# Patient Record
Sex: MALE | Race: WHITE
[De-identification: names, ages, dates, MRNs, and addresses within clinical notes are randomized per-mention and may not be internally consistent; named-entity substitution may affect disease eponyms.]

---

## 2017-02-02 ENCOUNTER — HOSPITAL ENCOUNTER (EMERGENCY)
Dept: HOSPITAL 25 - UCCORT | Age: 22
Discharge: HOME | End: 2017-02-02
Payer: COMMERCIAL

## 2017-02-02 VITALS — SYSTOLIC BLOOD PRESSURE: 146 MMHG | DIASTOLIC BLOOD PRESSURE: 85 MMHG

## 2017-02-02 DIAGNOSIS — J11.1: Primary | ICD-10-CM

## 2017-02-02 DIAGNOSIS — Z72.0: ICD-10-CM

## 2017-02-02 PROCEDURE — 99201: CPT

## 2017-02-02 PROCEDURE — G0463 HOSPITAL OUTPT CLINIC VISIT: HCPCS

## 2017-02-02 NOTE — UC
Respiratory Complaint HPI





- HPI Summary


HPI Summary: 





5 days of cough, chills, body aches. Temp up to 101, but lower today, last dose 

of ibuprofen was this morning. 


Decreased appetite, but no vomiting or diarrhea. + headache. 





- History of Current Complaint


Chief Complaint: UCRespiratory


Stated Complaint: COUGH


Time Seen by Provider: 17 18:31


Hx Obtained From: Patient


Onset/Duration: Gradual Onset, Lasting Days - 5


Timing: Constant


Severity Initially: Moderate


Severity Currently: Moderate


Character: Cough: Nonproductive


Aggravating Factors: Exertion


Alleviating Factors: OTC Meds


Associated Signs And Symptoms: Positive: Fever, Chills





- Risk Factors


Pulmonary Embolism Risk Factors: Negative


Cardiac Risk Factors: Negative


Pseudomonas Risk Factors: Negative


Tuberculosis Risk Factors: Negative





- Allergies/Home Medications


Allergies/Adverse Reactions: 


 Allergies











Allergy/AdvReac Type Severity Reaction Status Date / Time


 


No Known Allergies Allergy   Verified 17 17:49











Home Medications: 


 Home Medications





Ascorbic Acid TAB* [Vitamin C  TAB*] 500 mg PO DAILY PRN 17 [History 

Confirmed 17]


Ibuprofen TAB* [Advil TAB*] 400 mg PO Q8H PRN 17 [History Confirmed ]


Phenylephrine-Doxylamine-Dextr [Nyquil Severe Cold/Flu 5-6.25- mg/15Ml] 2 

tab PO QPM PRN 17 [History Confirmed 17]


guaiFENesin ER TAB [Mucinex*] 600 mg PO ONCE PRN 17 [History Confirmed ]











PMH/Surg Hx/FS Hx/Imm Hx


Previously Healthy: Yes





- Surgical History


Surgical History: None





- Family History


Known Family History: Positive: Other - father  CA pancreas, lymphoma age 50





- Social History


Occupation: Student


Lives: Alone - --dorm


Alcohol Use: Weekly


Alcohol Amount: 10


Substance Use Type: None


Smoking Status (MU): Current Some Day Smoker





Review of Systems


Constitutional: Fever, Fatigue


Skin: Negative


Eyes: Negative


ENT: Sore Throat


Respiratory: Cough


Cardiovascular: Negative


Gastrointestinal: Negative


Genitourinary: Negative


Motor: Negative


Neurovascular: Negative


Musculoskeletal: Arthralgia


Neurological: Headache


Psychological: Negative


All Other Systems Reviewed And Are Negative: Yes





Physical Exam


Triage Information Reviewed: Yes


Appearance: Ill-Appearing - looks mildly unwell.


Vital Signs: 


 Initial Vital Signs











Temp  98.3 F   17 17:42


 


Pulse  72   17 17:42


 


Resp  20   17 17:42


 


BP  146/85   17 17:42


 


Pulse Ox  100   17 17:42











Eyes: Positive: Conjunctiva Clear


ENT: Positive: Pharyngeal erythema


Neck exam: Normal


Neck: Positive: Supple, Nontender, No Lymphadenopathy


Respiratory: Positive: Lungs clear, Normal breath sounds


Cardiovascular: Positive: RRR, No Murmur


Abdomen Description: Positive: Nontender, No Organomegaly, Soft


Musculoskeletal Exam: Normal


Neurological Exam: Normal


Neurological: Positive: Alert, Muscle Tone Normal


Psychological Exam: Normal


Skin Exam: Normal





UC Diagnostic Evaluation





- Laboratory


O2 Sat by Pulse Oximetry: 100





Respiratory Course/Dx





- Course


Course Of Treatment: symptomatic treatment of influenza





- Differential Dx/Diagnosis


Differential Diagnosis/HQI/PQRI: Bronchitis, Influenza, Laryngitis, Sinusitis


Provider Diagnoses: influenza





Discharge





- Discharge Plan


Condition: Stable


Disposition: HOME


Patient Education Materials:  Influenza (ED)


Forms:  *School Release


Additional Instructions: 


Continue symptomatic treatment, with continued high intake of fluids, but try 

to get some nutrition (soups, eggs, toast, yogurt etc)


Use ibuprofen 600mg every 6 hours as needed for aching and fever.

## 2017-11-27 ENCOUNTER — HOSPITAL ENCOUNTER (EMERGENCY)
Dept: HOSPITAL 25 - UCCORT | Age: 22
Discharge: HOME | End: 2017-11-27
Payer: COMMERCIAL

## 2017-11-27 VITALS — DIASTOLIC BLOOD PRESSURE: 74 MMHG | SYSTOLIC BLOOD PRESSURE: 127 MMHG
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
## 2017-11-27 DIAGNOSIS — S20.469A: Primary | ICD-10-CM
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
## 2017-11-27 DIAGNOSIS — Z72.0: ICD-10-CM
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
## 2017-11-27 PROCEDURE — G0463 HOSPITAL OUTPT CLINIC VISIT: HCPCS

## 2017-11-27 PROCEDURE — 99212 OFFICE O/P EST SF 10 MIN: CPT

## 2017-11-27 NOTE — UC
Skin Complaint HPI





- HPI Summary


HPI Summary: 





Pt presents with tick bite. He tells me that 3 days ago he went hunting. 

Earlier today his roommate noticed a tick attached to his upper back. The tick 

was removed. Pt is here concerned about lyme disease. Denies fever, chills, 

headache, joint pain/swelling, SOB, chest pain, abdominal pain, N/V/D/C.





- History of Current Complaint


Chief Complaint: UCSkin


Time Seen by Provider: 17 18:34


Stated Complaint: TICK BITE


Hx Obtained From: Patient


Skin Exposure Onset/Duration: Days Ago





- Allergy/Home Medications


Allergies/Adverse Reactions: 


 Allergies











Allergy/AdvReac Type Severity Reaction Status Date / Time


 


No Known Allergies Allergy   Verified 17 18:35











Home Medications: 


 Home Medications





NK [No Home Medications Reported]  17 [History Confirmed 17]











Review of Systems


Constitutional: Negative


Skin: Other - Tick bite upper back


Eyes: Negative


ENT: Negative


Respiratory: Negative


Cardiovascular: Negative


Gastrointestinal: Negative


Neurological: Negative


Psychological: Negative


All Other Systems Reviewed And Are Negative: Yes





PMH/Surg Hx/FS Hx/Imm Hx


Previously Healthy: Yes





- Surgical History


Surgical History: None





- Family History


Known Family History: Positive: Other - father  CA pancreas, lymphoma age 50





- Social History


Alcohol Use: Weekly


Alcohol Amount: 10


Substance Use Type: None


Smoking Status (MU): Current Some Day Smoker





- Immunization History


Most Recent Influenza Vaccination: HAS NOT HAD





Physical Exam


Triage Information Reviewed: Yes


Appearance: Well-Appearing, Well-Nourished


Vital Signs: 


 Initial Vital Signs











Temp  98.3 F   17 18:28


 


Pulse  69   17 18:28


 


Resp  18   17 18:28


 


BP  127/74   17 18:28


 


Pulse Ox  100   17 18:28











Vital Signs Reviewed: Yes


ENT: Positive: Hearing grossly normal, Pharynx normal, TMs normal, Uvula 

midline.  Negative: Pharyngeal erythema, Nasal congestion, Nasal drainage, TM 

bulging, TM dull, TM red, Tonsillar swelling, Tonsillar exudate, Sinus 

tenderness


Neck: Positive: Supple, Nontender, No Lymphadenopathy


Respiratory: Positive: Chest non-tender, Lungs clear, Normal breath sounds, No 

respiratory distress, No accessory muscle use


Cardiovascular: Positive: RRR, No Murmur, Pulses Normal


Neurological: Positive: Alert


Psychological: Positive: Age Appropriate Behavior


Skin: Positive: Other - Approx 5mm diameter of erythema with central clearing 

consistent with tick bite. Does not appear to have insect remains within skin. 

No edema, discharge, or streaking.





Course/Dx





- Course


Course Of Treatment: Tick likely attached ~72 hours. Treatment options were 

discussed and pt elected for 200mg Doxy today.





- Diagnoses


Provider Diagnoses: Tick bite upper back





Discharge





- Discharge Plan


Condition: Stable


Disposition: HOME


Patient Education Materials:  Lyme Disease (ED), Tick Bite (ED)


Referrals: 


Non Staff,Doctor [Primary Care Provider] - 


Additional Instructions: 


If you develop a fever, SOB, chest pain, new or worsening symptoms - please 

call your PCP or go to the ED.





Monitor for signs and symptoms of lyme disease, such as a bulls eye rash, 

headache, neck pain, fatigue, joint pain or swelling.

## 2018-02-12 ENCOUNTER — HOSPITAL ENCOUNTER (EMERGENCY)
Dept: HOSPITAL 25 - UCCORT | Age: 23
Discharge: HOME | End: 2018-02-12
Payer: COMMERCIAL

## 2018-02-12 VITALS — DIASTOLIC BLOOD PRESSURE: 76 MMHG | SYSTOLIC BLOOD PRESSURE: 131 MMHG

## 2018-02-12 DIAGNOSIS — J11.1: Primary | ICD-10-CM

## 2018-02-12 DIAGNOSIS — Z72.0: ICD-10-CM

## 2018-02-12 PROCEDURE — 99212 OFFICE O/P EST SF 10 MIN: CPT

## 2018-02-12 PROCEDURE — G0463 HOSPITAL OUTPT CLINIC VISIT: HCPCS

## 2018-02-12 NOTE — ED
Respiratory





- HPI Summary


HPI Summary: 





22 yr old male with cough, runny nose, myalgies, chills. Onset last night.  He 

is around many people at [facility] with influenza.  He has no other 

complaints. 





- History of Current Complaint


Chief Complaint: UCGeneralIllness


Stated Complaint: FLU LIKE SYMPTOMS


Time Seen by Provider: 18 22:07


Pain Intensity: 6





- Allergy/Home Medications


Allergies/Adverse Reactions: 


 Allergies











Allergy/AdvReac Type Severity Reaction Status Date / Time


 


No Known Allergies Allergy   Verified 18 21:57











Home Medications: 


 Home Medications





Eucalyptus/Menthol [Cherry Cough Drops] 1 shelly MT DAILY PRN 18 [History 

Confirmed 18]


Ibuprofen TAB* [Advil TAB*] 400 mg PO Q6H PRN 18 [History Confirmed ]


Phenylephrine/Dm/Acetaminop/GG [Daytime Severe Cold-Flu Liquid] 30 ml PO DAILY 

PRN 18 [History Confirmed 18]











PMH/Surg Hx/FS Hx/Imm Hx


Infectious Disease History: No


Infectious Disease History: Reports: Traveled Outside the [place] in Last 30 Days - 

RETURNED FROM [facility]


   Denies: History Other Infectious Disease





- Family History


Known Family History: Positive: Other - father  CA pancreas, lymphoma age 50





- Social History


Occupation: Student


Lives: Dormitory/Roommates


Alcohol Use: Weekly


Alcohol Amount: 10


Substance Use Type: Reports: None


Smoking Status (MU): Current Some Day Smoker


Type: Cigarettes


Length of Time of Smoking/Using Tobacco: 4 YRS





Review of Systems


Positive: Fever, Chills


Positive: Nasal Discharge


Positive: Cough


Positive: Myalgia


All Other Systems Reviewed And Are Negative: Yes





Physical Exam


Triage Information Reviewed: Yes


Vital Signs On Initial Exam: 


 Initial Vitals











Temp Pulse Resp BP Pulse Ox


 


 99 F   80   18   131/76   100 


 


 18 22:02  18 22:02  18 22:02  18 22:02  18 22:02











Vital Signs Reviewed: Yes


Appearance: Positive: Well-Appearing, No Pain Distress


Skin: Positive: Warm, Skin Color Reflects Adequate Perfusion


Eyes: Positive: EOMI


ENT: Positive: Pharyngeal erythema, Nasal congestion, Nasal drainage, TMs normal


Neck: Positive: Nontender


Respiratory/Lung Sounds: Positive: Clear to Auscultation, Breath Sounds Present


Cardiovascular: Positive: RRR.  Negative: Murmur


Abdomen Description: Positive: Nontender


Musculoskeletal: Positive: Strength/ROM Intact


Neurological: Positive: Sensory/Motor Intact, Alert, Oriented to Person Place, 

Time, CN Intact II-III


Psychiatric: Positive: Normal





- Wayne Coma Scale


Best Eye Response: 4 - Spontaneous


Best Motor Response: 6 - Obeys Commands


Best Verbal Response: 5 - Oriented


Coma Scale Total: 15





Diagnostics





- Vital Signs


 Vital Signs











  Temp Pulse Resp BP Pulse Ox


 


 18 22:02  99 F  80  18  131/76  100














- Laboratory


Lab Statement: Any lab studies that have been ordered have been reviewed, and 

results considered in the medical decision making process.





Disposition





- Course


Course Of Treatment: 22 yr old with influenza.  Rx tamiflu





- Diagnoses


Provider Diagnoses: 


 Influenza








Discharge





- Discharge Plan


Condition: Good


Disposition: HOME


Prescriptions: 


Oseltamivir CAP* [Tamiflu CAP*] 75 mg PO BID #10 cap


Patient Education Materials:  Influenza (ED)


Referrals: 


[facility] [Outside]


No Primary Care Phys,NOPCP [Primary Care Provider] -

## 2018-02-27 ENCOUNTER — HOSPITAL ENCOUNTER (EMERGENCY)
Dept: HOSPITAL 25 - UCCORT | Age: 23
Discharge: LEFT BEFORE DISCHARGE | End: 2018-02-27
Payer: COMMERCIAL

## 2018-02-27 VITALS — SYSTOLIC BLOOD PRESSURE: 137 MMHG | DIASTOLIC BLOOD PRESSURE: 93 MMHG

## 2018-02-27 DIAGNOSIS — R51: Primary | ICD-10-CM

## 2018-02-27 DIAGNOSIS — I10: ICD-10-CM

## 2018-02-27 DIAGNOSIS — F17.210: ICD-10-CM

## 2018-02-27 PROCEDURE — G0463 HOSPITAL OUTPT CLINIC VISIT: HCPCS

## 2018-02-27 PROCEDURE — 99212 OFFICE O/P EST SF 10 MIN: CPT

## 2018-02-27 NOTE — ED
Headache





- HPI Summary


HPI Summary: 





22 yr old male with 8-9/10, sudden onset of right frontal/temporal headache, 48 

hours ago.  He says it came on out of the blue.  No change in vision, hearing, 

speech, swallowing, no change in strenght, gait, or sensation.   





He states he has not a history of headaches.





He denies fever, chills, neck stiffness.  he states he has some light 

sensitivity.  











- History Of Current Complaint


Chief Complaint: UCHeadache


Stated Complaint: HEADACHE


Time Seen by Provider: 18 18:54





- Allergies/Home Medications


Allergies/Adverse Reactions: 


 Allergies











Allergy/AdvReac Type Severity Reaction Status Date / Time


 


No Known Allergies Allergy   Verified 18 18:20














PMH/Surg Hx/FS Hx/Imm Hx


Infectious Disease History: No


Infectious Disease History: 


   Denies: History Other Infectious Disease, Traveled Outside the [place] in Last 30 

Days





- Family History


Known Family History: Positive: Other - father  CA pancreas, lymphoma age 50





- Social History


Alcohol Use: Weekly


Alcohol Amount: 10


Substance Use Type: Reports: None


Smoking Status (MU): Current Some Day Smoker


Type: Cigarettes


Length of Time of Smoking/Using Tobacco: 4 YRS





Review of Systems


Positive: Headache.  Negative: Weakness, Paresthesia, Numbness, Syncope, 

Slurred Speech


All Other Systems Reviewed And Are Negative: Yes





Physical Exam


Triage Information Reviewed: Yes


Vital Signs On Initial Exam: 


 Initial Vitals











Temp Pulse Resp BP Pulse Ox


 


 98.3 F   58   18   137/93   100 


 


 18 18:17  18 18:17  18 18:17  18 18:17  18 18:17











Vital Signs Reviewed: Yes


Appearance: Positive: Well-Appearing, No Pain Distress


Skin: Positive: Warm, Skin Color Reflects Adequate Perfusion


Head/Face: Positive: Normal Head/Face Inspection, Other - his forehead, scalp 

are non tender.


Eyes: Positive: EOMI, SHAYLA


Neck: Positive: Supple, Nontender.  Negative: Nuchal Rigidity


Respiratory/Lung Sounds: Positive: Clear to Auscultation, Breath Sounds Present


Cardiovascular: Positive: RRR.  Negative: Murmur


Abdomen Description: Positive: Nontender


Musculoskeletal: Positive: Strength/ROM Intact


Neurological: Positive: Sensory/Motor Intact, Alert, Oriented to Person Place, 

Time, CN Intact II-III, Normal Gait, Finger to Nose - normal, Speech Normal





- Wykoff Coma Scale


Best Eye Response: 4 - Spontaneous


Best Motor Response: 6 - Obeys Commands


Best Verbal Response: 5 - Oriented


Coma Scale Total: 15





Diagnostics





- Vital Signs


 Vital Signs











  Temp Pulse Resp BP Pulse Ox


 


 18 18:17  98.3 F  58  18  137/93  100














- Laboratory


Lab Statement: Any lab studies that have been ordered have been reviewed, and 

results considered in the medical decision making process.





Headache Course/Dx





- Course


Course Of Treatment: 22 yr old with sudden onset headache.  he declined 

ambulance transfer to ER.  He signed out AMA with risks understood.   He 

verbalized he is going to the [place] ER.





- Diagnoses


Provider Diagnoses: 


 Headache, Hypertension








Discharge





- Discharge Plan


Condition: Good


Disposition: AGAINST MEDICAL ADVICE


Referrals: 


No Primary Care PhysNOPCP [Primary Care Provider] -

## 2023-04-10 ENCOUNTER — APPOINTMENT (OUTPATIENT)
Dept: INTERNAL MEDICINE | Facility: CLINIC | Age: 28
End: 2023-04-10
Payer: COMMERCIAL

## 2023-04-10 VITALS
WEIGHT: 220 LBS | SYSTOLIC BLOOD PRESSURE: 131 MMHG | HEART RATE: 77 BPM | DIASTOLIC BLOOD PRESSURE: 81 MMHG | OXYGEN SATURATION: 97 % | HEIGHT: 68 IN | BODY MASS INDEX: 33.34 KG/M2 | TEMPERATURE: 98 F

## 2023-04-10 DIAGNOSIS — Z78.9 OTHER SPECIFIED HEALTH STATUS: ICD-10-CM

## 2023-04-10 DIAGNOSIS — Z80.7 FAMILY HISTORY OF OTHER MALIGNANT NEOPLASMS OF LYMPHOID, HEMATOPOIETIC AND RELATED TISSUES: ICD-10-CM

## 2023-04-10 PROCEDURE — 99204 OFFICE O/P NEW MOD 45 MIN: CPT | Mod: 25

## 2023-04-10 PROCEDURE — 99385 PREV VISIT NEW AGE 18-39: CPT | Mod: 25

## 2023-04-12 LAB
25(OH)D3 SERPL-MCNC: 30.1 NG/ML
ALBUMIN SERPL ELPH-MCNC: 4.5 G/DL
ALP BLD-CCNC: 46 U/L
ALT SERPL-CCNC: 23 U/L
ANION GAP SERPL CALC-SCNC: 12 MMOL/L
APPEARANCE: CLEAR
AST SERPL-CCNC: 24 U/L
BASOPHILS # BLD AUTO: 0.06 K/UL
BASOPHILS NFR BLD AUTO: 0.9 %
BILIRUB SERPL-MCNC: 0.3 MG/DL
BILIRUBIN URINE: NEGATIVE
BLOOD URINE: NEGATIVE
BUN SERPL-MCNC: 19 MG/DL
CALCIUM SERPL-MCNC: 9.8 MG/DL
CHLORIDE SERPL-SCNC: 103 MMOL/L
CHOLEST SERPL-MCNC: 171 MG/DL
CO2 SERPL-SCNC: 24 MMOL/L
COLOR: NORMAL
CREAT SERPL-MCNC: 1.17 MG/DL
EGFR: 88 ML/MIN/1.73M2
EOSINOPHIL # BLD AUTO: 0.27 K/UL
EOSINOPHIL NFR BLD AUTO: 4.2 %
FOLATE SERPL-MCNC: 6.8 NG/ML
GLUCOSE QUALITATIVE U: NEGATIVE
GLUCOSE SERPL-MCNC: 90 MG/DL
HCT VFR BLD CALC: 42.9 %
HDLC SERPL-MCNC: 50 MG/DL
HGB BLD-MCNC: 14.1 G/DL
IMM GRANULOCYTES NFR BLD AUTO: 0.8 %
KETONES URINE: NEGATIVE
LDLC SERPL CALC-MCNC: 95 MG/DL
LEUKOCYTE ESTERASE URINE: NEGATIVE
LYMPHOCYTES # BLD AUTO: 1.57 K/UL
LYMPHOCYTES NFR BLD AUTO: 24.7 %
MAN DIFF?: NORMAL
MCHC RBC-ENTMCNC: 28.9 PG
MCHC RBC-ENTMCNC: 32.9 GM/DL
MCV RBC AUTO: 87.9 FL
MONOCYTES # BLD AUTO: 0.4 K/UL
MONOCYTES NFR BLD AUTO: 6.3 %
NEUTROPHILS # BLD AUTO: 4.01 K/UL
NEUTROPHILS NFR BLD AUTO: 63.1 %
NITRITE URINE: NEGATIVE
NONHDLC SERPL-MCNC: 121 MG/DL
PH URINE: 7
PLATELET # BLD AUTO: 262 K/UL
POTASSIUM SERPL-SCNC: 4.7 MMOL/L
PROLACTIN SERPL-MCNC: 10.1 NG/ML
PROT SERPL-MCNC: 6.9 G/DL
PROTEIN URINE: NEGATIVE
RBC # BLD: 4.88 M/UL
RBC # FLD: 13.1 %
SODIUM SERPL-SCNC: 139 MMOL/L
SPECIFIC GRAVITY URINE: 1.03
TESTOST FREE SERPL-MCNC: 13.4 PG/ML
TESTOST SERPL-MCNC: 354 NG/DL
TRIGL SERPL-MCNC: 131 MG/DL
TSH SERPL-ACNC: 3 UIU/ML
UROBILINOGEN URINE: NORMAL
VIT B12 SERPL-MCNC: 592 PG/ML
WBC # FLD AUTO: 6.36 K/UL

## 2023-05-04 ENCOUNTER — APPOINTMENT (OUTPATIENT)
Dept: INTERNAL MEDICINE | Facility: CLINIC | Age: 28
End: 2023-05-04

## 2023-05-08 ENCOUNTER — TRANSCRIPTION ENCOUNTER (OUTPATIENT)
Age: 28
End: 2023-05-08

## 2023-07-17 ENCOUNTER — APPOINTMENT (OUTPATIENT)
Dept: ORTHOPEDIC SURGERY | Facility: CLINIC | Age: 28
End: 2023-07-17
Payer: COMMERCIAL

## 2023-07-17 ENCOUNTER — NON-APPOINTMENT (OUTPATIENT)
Age: 28
End: 2023-07-17

## 2023-07-17 VITALS — BODY MASS INDEX: 33.34 KG/M2 | WEIGHT: 220 LBS | HEIGHT: 68 IN

## 2023-07-17 PROCEDURE — 99204 OFFICE O/P NEW MOD 45 MIN: CPT

## 2023-07-17 PROCEDURE — 73140 X-RAY EXAM OF FINGER(S): CPT | Mod: LT

## 2023-07-17 NOTE — IMAGING
[de-identified] : LEFT HAND\par IF: amputation of tip through lunula. nail plate avulsed. no exposed bone. no erythema.\par IF: good ext, PIPJ mild limited flex, DIPJ very limited flex.\par good flex/ext other digits. \par brisk cap refill all digits.\par \par \par XRAYS OF LEFT INDEX FINGER: dressing overlying distal phalanx. amputation through distal phalanx tuft.

## 2023-07-17 NOTE — ASSESSMENT
[FreeTextEntry1] : The condition was explained to the patient.\par No signs/symptoms of infection. Plan for healing by secondary intention. Discussed risk of persistent pain, stiffness, loss of motion, sensitivity, painful neuroma, may need revision amputation.\par - local wound care: ok to wash with soap and water. do no scrub or soak wound. apply vaseline and coban dressing, change daily or more frequently if soiled.\par - activity as tolerated, but keep wound clean.\par - Work: recommend O[employer].\par \par F/u 2 weeks for wound check.\par Discussed signs/symptoms of infection that would warrant urgent evaluation in office/ER - worsening pain, redness, wound drainage, fever, chills, systemic ill symptoms.

## 2023-07-17 NOTE — HISTORY OF PRESENT ILLNESS
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 Finger was crushed in latch of door, amputating tip of finger.\par Went to [facility] ER => wound dressed, Rx abx. They did not reattach tip of finger. \par Pain manageable. Currently taking advil prn and keflex qid. \par No fevers, chills, arm pain. No prior injury. No paresthesias. \par \par Hx: none. [] : no [FreeTextEntry1] : Lt Finger  [FreeTextEntry3] : 07/13/2023 [FreeTextEntry5] : Pt Injured Lt Index Finger. Pt states he was holding a door Thursday night and his Lt Index Finger got caught on the hinge of the door and clean cut sliced the tip of the index finger including the nail. Pt went to [facility] [place] was seen and treated had x-ray done dx tip of bone chipped. Pt wrapped up. Lt Finger pain and throbbing.  [FreeTextEntry9] : Antibiotics  [de-identified] : XR

## 2023-07-31 ENCOUNTER — APPOINTMENT (OUTPATIENT)
Dept: ORTHOPEDIC SURGERY | Facility: CLINIC | Age: 28
End: 2023-07-31
Payer: COMMERCIAL

## 2023-07-31 DIAGNOSIS — S68.621A: ICD-10-CM

## 2023-07-31 PROCEDURE — 99213 OFFICE O/P EST LOW 20 MIN: CPT

## 2023-07-31 NOTE — ASSESSMENT
[FreeTextEntry1] : - continue local wound care: ok to wash with soap and water. do no scrub or soak wound. apply vaseline and coban dressing, change daily or more frequently if soiled. - demonstrated HEP for digital ROM. - Work: continue OOW.  F/u 4 weeks.

## 2023-07-31 NOTE — IMAGING
[de-identified] : LEFT HAND IF: amputation of tip through ~50% of nail bed. + granulation tissue. no erythema or drainage. IF: good ext, PIPJ mild limited flex, DIPJ limited flex. composite flex to thenar eminence. good flex/ext other digits.  brisk cap refill all digits.

## 2023-07-31 NOTE — HISTORY OF PRESENT ILLNESS
[de-identified] : 7/31/23: 2.5 weeks s/p LEFT index finger partial amputation on 7/14/23. performing daily dressing changes. doing well. Denies fever, chills, wound redness or drainage, or systemic ill symptoms.  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 Finger was crushed in latch of door, amputating tip of finger. Went to [facility] ER => wound dressed, Rx abx. They did not reattach tip of finger.  Pain manageable. Currently taking advil prn and keflex qid.  No fevers, chills, arm pain. No prior injury. No paresthesias.   Hx: none. [FreeTextEntry1] : LEFT index finger  [FreeTextEntry5] : JOSHUA is here today to follow up on his LEFT index finger. pt states since last visit, pain decreased.

## 2023-08-31 ENCOUNTER — APPOINTMENT (OUTPATIENT)
Dept: ORTHOPEDIC SURGERY | Facility: CLINIC | Age: 28
End: 2023-08-31

## 2024-05-14 ENCOUNTER — LABORATORY RESULT (OUTPATIENT)
Age: 29
End: 2024-05-14

## 2024-05-14 ENCOUNTER — APPOINTMENT (OUTPATIENT)
Dept: INTERNAL MEDICINE | Facility: CLINIC | Age: 29
End: 2024-05-14
Payer: COMMERCIAL

## 2024-05-14 VITALS
TEMPERATURE: 98 F | WEIGHT: 207 LBS | OXYGEN SATURATION: 96 % | HEART RATE: 62 BPM | SYSTOLIC BLOOD PRESSURE: 126 MMHG | BODY MASS INDEX: 31.37 KG/M2 | HEIGHT: 68 IN | DIASTOLIC BLOOD PRESSURE: 77 MMHG

## 2024-05-14 DIAGNOSIS — R68.89 OTHER GENERAL SYMPTOMS AND SIGNS: ICD-10-CM

## 2024-05-14 DIAGNOSIS — R53.81 OTHER MALAISE: ICD-10-CM

## 2024-05-14 DIAGNOSIS — R63.4 ABNORMAL WEIGHT LOSS: ICD-10-CM

## 2024-05-14 DIAGNOSIS — R68.82 DECREASED LIBIDO: ICD-10-CM

## 2024-05-14 DIAGNOSIS — Z00.00 ENCOUNTER FOR GENERAL ADULT MEDICAL EXAMINATION W/OUT ABNORMAL FINDINGS: ICD-10-CM

## 2024-05-14 DIAGNOSIS — E66.9 OBESITY, UNSPECIFIED: ICD-10-CM

## 2024-05-14 DIAGNOSIS — R14.1 GAS PAIN: ICD-10-CM

## 2024-05-14 DIAGNOSIS — Z87.898 PERSONAL HISTORY OF OTHER SPECIFIED CONDITIONS: ICD-10-CM

## 2024-05-14 DIAGNOSIS — Z76.89 PERSONS ENCOUNTERING HEALTH SERVICES IN OTHER SPECIFIED CIRCUMSTANCES: ICD-10-CM

## 2024-05-14 DIAGNOSIS — F17.290 NICOTINE DEPENDENCE, OTHER TOBACCO PRODUCT, UNCOMPLICATED: ICD-10-CM

## 2024-05-14 DIAGNOSIS — R53.83 OTHER MALAISE: ICD-10-CM

## 2024-05-14 PROCEDURE — 99395 PREV VISIT EST AGE 18-39: CPT | Mod: 25

## 2024-05-14 RX ORDER — SIMETHICONE 180 MG
180 CAPSULE ORAL EVERY 8 HOURS
Qty: 90 | Refills: 1 | Status: ACTIVE | COMMUNITY
Start: 2024-05-14 | End: 1900-01-01

## 2024-05-16 LAB
25(OH)D3 SERPL-MCNC: 38.2 NG/ML
ALBUMIN SERPL ELPH-MCNC: 4.7 G/DL
ALMOND IGE QN: 0.97 KUA/L
ALP BLD-CCNC: 44 U/L
ALT SERPL-CCNC: 25 U/L
ANION GAP SERPL CALC-SCNC: 13 MMOL/L
APPEARANCE: CLEAR
AST SERPL-CCNC: 28 U/L
BASOPHILS # BLD AUTO: 0.07 K/UL
BASOPHILS NFR BLD AUTO: 0.9 %
BILIRUB SERPL-MCNC: 0.6 MG/DL
BILIRUBIN URINE: NEGATIVE
BLOOD URINE: NEGATIVE
BRAZIL NUT IGE QN: <0.1 KUA/L
BUN SERPL-MCNC: 16 MG/DL
CALCIUM SERPL-MCNC: 9.9 MG/DL
CASHEW NUT IGE QN: <0.1 KUA/L
CHLORIDE SERPL-SCNC: 102 MMOL/L
CHOLEST SERPL-MCNC: 170 MG/DL
CO2 SERPL-SCNC: 26 MMOL/L
CODFISH IGE QN: <0.1 KUA/L
COLOR: YELLOW
COW MILK IGE QN: 0.16 KUA/L
CREAT SERPL-MCNC: 1.22 MG/DL
DEPRECATED ALMOND IGE RAST QL: 2 (ref 0–?)
DEPRECATED BRAZIL NUT IGE RAST QL: 0 (ref 0–?)
DEPRECATED CASHEW NUT IGE RAST QL: 0 (ref 0–?)
DEPRECATED CODFISH IGE RAST QL: 0 (ref 0–?)
DEPRECATED COW MILK IGE RAST QL: NORMAL (ref 0–?)
DEPRECATED EGG WHITE IGE RAST QL: 1 (ref 0–?)
DEPRECATED HAZELNUT IGE RAST QL: 3 (ref 0–?)
DEPRECATED PEANUT IGE RAST QL: 1 (ref 0–?)
DEPRECATED SALMON IGE RAST QL: 0 (ref 0–?)
DEPRECATED SCALLOP IGE RAST QL: <0.1 KUA/L
DEPRECATED SESAME SEED IGE RAST QL: NORMAL (ref 0–?)
DEPRECATED SHRIMP IGE RAST QL: 0 (ref 0–?)
DEPRECATED SOYBEAN IGE RAST QL: 0 (ref 0–?)
DEPRECATED TUNA IGE RAST QL: 0 (ref 0–?)
DEPRECATED WALNUT IGE RAST QL: 2 (ref 0–?)
DEPRECATED WHEAT IGE RAST QL: NORMAL (ref 0–?)
EGFR: 83 ML/MIN/1.73M2
EGG WHITE IGE QN: 0.48 KUA/L
EOSINOPHIL # BLD AUTO: 0.19 K/UL
EOSINOPHIL NFR BLD AUTO: 2.5 %
ESTIMATED AVERAGE GLUCOSE: 100 MG/DL
GLUCOSE QUALITATIVE U: NEGATIVE MG/DL
GLUCOSE SERPL-MCNC: 83 MG/DL
HAZELNUT IGE QN: 3.77 KUA/L
HBA1C MFR BLD HPLC: 5.1 %
HCT VFR BLD CALC: 40.4 %
HDLC SERPL-MCNC: 57 MG/DL
HGB BLD-MCNC: 13.5 G/DL
IMM GRANULOCYTES NFR BLD AUTO: 0.3 %
KETONES URINE: NEGATIVE MG/DL
LDLC SERPL CALC-MCNC: 94 MG/DL
LEUKOCYTE ESTERASE URINE: NEGATIVE
LYMPHOCYTES # BLD AUTO: 1.69 K/UL
LYMPHOCYTES NFR BLD AUTO: 22.1 %
MAN DIFF?: NORMAL
MCHC RBC-ENTMCNC: 28.7 PG
MCHC RBC-ENTMCNC: 33.4 GM/DL
MCV RBC AUTO: 85.8 FL
MONOCYTES # BLD AUTO: 0.42 K/UL
MONOCYTES NFR BLD AUTO: 5.5 %
NEUTROPHILS # BLD AUTO: 5.27 K/UL
NEUTROPHILS NFR BLD AUTO: 68.7 %
NITRITE URINE: NEGATIVE
NONHDLC SERPL-MCNC: 112 MG/DL
PEANUT IGE QN: 0.68 KUA/L
PH URINE: 7.5
PLATELET # BLD AUTO: 234 K/UL
POTASSIUM SERPL-SCNC: 3.9 MMOL/L
PROT SERPL-MCNC: 7.4 G/DL
PROTEIN URINE: NEGATIVE MG/DL
RBC # BLD: 4.71 M/UL
RBC # FLD: 13.1 %
SALMON IGE QN: <0.1 KUA/L
SCALLOP IGE QN: 0 (ref 0–?)
SCALLOP IGE QN: <0.1 KUA/L
SESAME SEED IGE QN: 0.15 KUA/L
SODIUM SERPL-SCNC: 141 MMOL/L
SOYBEAN IGE QN: <0.1 KUA/L
SPECIFIC GRAVITY URINE: 1.01
TOTAL IGE SMQN RAST: 122 KU/L
TRIGL SERPL-MCNC: 101 MG/DL
TSH SERPL-ACNC: 1.96 UIU/ML
TUNA IGE QN: <0.1 KUA/L
UROBILINOGEN URINE: 0.2 MG/DL
WALNUT IGE QN: 0.75 KUA/L
WBC # FLD AUTO: 7.66 K/UL
WHEAT IGE QN: 0.13 KUA/L